# Patient Record
(demographics unavailable — no encounter records)

---

## 2025-03-05 NOTE — HISTORY OF PRESENT ILLNESS
[de-identified] : Patient Name: SALOMÓN DIEGO MRN: [id number] Referring Provider: none Date: 03/12/2025   Diagnosis: GB hyperkinesis   50 year old female presents for evaluation of GB hyperkinesis. 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  Abdominal pain 2/17/25 - Showing gallbladder ejection fraction of 83% and mild duodenal-gastric reflux noted   Currently, Ms. DIEGO ~  ~  abdominal pain and discomfort, ~  ~ having decreased appetite, and ~  ~ nausea or vomiting. She ~  ~ changes in bowel habits and ~  ~ recent unintentional weight loss. She ~  ~ fevers, chills, or night sweats.   Functional Status: Ms. DIEGO is able to ~  ~ without fatigue or dyspnea.   She ~  ~ genetic testing